# Patient Record
(demographics unavailable — no encounter records)

---

## 2025-02-12 NOTE — ASSESSMENT
[Patient Optimized for Surgery] : Patient optimized for surgery [FreeTextEntry7] : avoids nsaids, mvi, herbal supplement 1 week prior to surgery

## 2025-02-12 NOTE — RESULTS/DATA
[] : results reviewed [de-identified] : 2/11/25 ramesh [de-identified] : 2/11/25 jessica chandler  [de-identified] : 2/11/25 ekg sinus bradycardia [de-identified] : ua wnl

## 2025-02-12 NOTE — RESULTS/DATA
[] : results reviewed [de-identified] : 2/11/25 ramesh [de-identified] : 2/11/25 jessica chandler  [de-identified] : 2/11/25 ekg sinus bradycardia [de-identified] : ua wnl

## 2025-02-12 NOTE — HISTORY OF PRESENT ILLNESS
[Self] : no previous adverse anesthesia reaction [FreeTextEntry1] : L. bunion and benigno surgery [FreeTextEntry2] : 2/14/25 [FreeTextEntry3] : Dr. Seda Rascon  [FreeTextEntry4] :  here for PREOP   FAX: [phone]  FAX [facility]: [phone]

## 2025-02-12 NOTE — HISTORY OF PRESENT ILLNESS
[Self] : no previous adverse anesthesia reaction [FreeTextEntry1] : L. bunion and benigno surgery [FreeTextEntry2] : 2/14/25 [FreeTextEntry3] : Dr. Seda Rascon  [FreeTextEntry4] :  here for PREOP   FAX: [phone]  FAX [facility]: [phone]